# Patient Record
Sex: MALE | Race: WHITE | Employment: FULL TIME | URBAN - METROPOLITAN AREA
[De-identification: names, ages, dates, MRNs, and addresses within clinical notes are randomized per-mention and may not be internally consistent; named-entity substitution may affect disease eponyms.]

---

## 2019-11-12 ENCOUNTER — HOSPITAL ENCOUNTER (EMERGENCY)
Facility: HOSPITAL | Age: 44
Discharge: HOME | End: 2019-11-12
Attending: PHYSICIAN ASSISTANT
Payer: COMMERCIAL

## 2019-11-12 VITALS
RESPIRATION RATE: 20 BRPM | BODY MASS INDEX: 32.47 KG/M2 | HEART RATE: 74 BPM | WEIGHT: 275 LBS | TEMPERATURE: 97 F | DIASTOLIC BLOOD PRESSURE: 74 MMHG | OXYGEN SATURATION: 98 % | SYSTOLIC BLOOD PRESSURE: 142 MMHG | HEIGHT: 77 IN

## 2019-11-12 DIAGNOSIS — R11.0 NAUSEA: Primary | ICD-10-CM

## 2019-11-12 DIAGNOSIS — R03.0 ELEVATED BLOOD PRESSURE READING: ICD-10-CM

## 2019-11-12 DIAGNOSIS — Z86.39 HISTORY OF DIABETIC GASTROPARESIS: ICD-10-CM

## 2019-11-12 PROCEDURE — 85025 COMPLETE CBC W/AUTO DIFF WBC: CPT | Performed by: PHYSICIAN ASSISTANT

## 2019-11-12 PROCEDURE — 82962 GLUCOSE BLOOD TEST: CPT

## 2019-11-12 PROCEDURE — 96374 THER/PROPH/DIAG INJ IV PUSH: CPT

## 2019-11-12 PROCEDURE — 99284 EMERGENCY DEPT VISIT MOD MDM: CPT

## 2019-11-12 PROCEDURE — 81003 URINALYSIS AUTO W/O SCOPE: CPT | Performed by: PHYSICIAN ASSISTANT

## 2019-11-12 PROCEDURE — 80048 BASIC METABOLIC PNL TOTAL CA: CPT | Performed by: PHYSICIAN ASSISTANT

## 2019-11-12 RX ORDER — ONDANSETRON 4 MG/1
4 TABLET, ORALLY DISINTEGRATING ORAL EVERY 6 HOURS PRN
Qty: 10 TABLET | Refills: 0 | Status: SHIPPED | OUTPATIENT
Start: 2019-11-12 | End: 2019-11-15

## 2019-11-12 RX ORDER — METOCLOPRAMIDE HYDROCHLORIDE 5 MG/ML
10 INJECTION INTRAMUSCULAR; INTRAVENOUS ONCE
Status: COMPLETED | OUTPATIENT
Start: 2019-11-12 | End: 2019-11-12

## 2019-11-12 RX ORDER — METOCLOPRAMIDE HYDROCHLORIDE 5 MG/5ML
10 SOLUTION ORAL EVERY 6 HOURS PRN
Qty: 560 ML | Refills: 0 | Status: SHIPPED | OUTPATIENT
Start: 2019-11-12 | End: 2019-11-26

## 2019-11-12 RX ORDER — ERYTHROMYCIN ETHYLSUCCINATE 200 MG/5ML
250 SUSPENSION ORAL
Qty: 300 ML | Refills: 0 | Status: SHIPPED | OUTPATIENT
Start: 2019-11-12

## 2019-11-12 NOTE — ED INITIAL ASSESSMENT (HPI)
Via EMS from work. Nausea since last night. 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  Refused zofran en route

## 2019-11-12 NOTE — ED NOTES
Pt requesting Reglan now. Medicated. Pt still drinking water while nauseated. Pt educated on not drinking water at this time.

## 2019-11-12 NOTE — ED NOTES
Nausea since last night with epigastric pain. Refusing pain meds at this time- requesting water. Denies any other complaints.

## 2019-11-12 NOTE — ED PROVIDER NOTES
Patient Seen in: [facility] AND [facility] Emergency Department    History   Patient presents with:  Nausea    Stated Complaint: vomiting    HPI    Get Sood is a 53year old male who presents with chief complaint of nausea and vomiting. Onset yesterday.   Carina Bark Temp 97 °F (36.1 °C)   Temp src Oral   SpO2 96 %   O2 Device None (Room air)       Current:/74   Pulse 74   Temp 97 °F (36.1 °C) (Oral)   Resp 20   Ht 195.6 cm (6' 5\")   Wt 124.7 kg   SpO2 98%   BMI 32.61 kg/m²     PULSE OX within normal limits on Creatinine 1.55 (*)     BUN/CREA Ratio 9.0 (*)     GFR, Non- 53 (*)     All other components within normal limits   POCT GLUCOSE - Abnormal; Notable for the following components:    POC Glucose  182 (*)     All other components within nyla pressure reading    Disposition:  Discharge    Follow-up:  Elvia Nunez MD  [address] [facility] [place].   M/C [address] [facility]  [phone]    Schedule an appointment as soon as possible for a visit in 2 days  For follow-up    LORNA Monahan

## (undated) NOTE — ED AVS SNAPSHOT
Lynne Arnold   MRN: [id number]    Department:  [facility] Emergency Department   Date of Visit:  11/12/2019           Disclosure     Insurance plans vary and the physician(s) referred by the ER may not be covered by your plan.  Please contact you CARE PHYSICIAN AT ONCE OR RETURN IMMEDIATELY TO THE EMERGENCY DEPARTMENT. If you have been prescribed any medication(s), please fill your prescription right away and begin taking the medication(s) as directed.   If you believe that any of the medications